# Patient Record
Sex: FEMALE | Race: WHITE
[De-identification: names, ages, dates, MRNs, and addresses within clinical notes are randomized per-mention and may not be internally consistent; named-entity substitution may affect disease eponyms.]

---

## 2021-10-18 ENCOUNTER — RESULT REVIEW (OUTPATIENT)
Age: 61
End: 2021-10-18

## 2022-06-09 ENCOUNTER — RESULT REVIEW (OUTPATIENT)
Age: 62
End: 2022-06-09

## 2022-06-09 ENCOUNTER — APPOINTMENT (OUTPATIENT)
Dept: GASTROENTEROLOGY | Facility: CLINIC | Age: 62
End: 2022-06-09
Payer: MEDICAID

## 2022-06-09 VITALS
BODY MASS INDEX: 22.18 KG/M2 | HEART RATE: 69 BPM | OXYGEN SATURATION: 98 % | SYSTOLIC BLOOD PRESSURE: 94 MMHG | HEIGHT: 66 IN | DIASTOLIC BLOOD PRESSURE: 58 MMHG | WEIGHT: 138 LBS | TEMPERATURE: 98 F

## 2022-06-09 PROBLEM — Z00.00 ENCOUNTER FOR PREVENTIVE HEALTH EXAMINATION: Status: ACTIVE | Noted: 2022-06-09

## 2022-06-09 PROCEDURE — 99204 OFFICE O/P NEW MOD 45 MIN: CPT

## 2022-06-09 NOTE — REASON FOR VISIT
[Consultation] : a consultation visit [FreeTextEntry1] : Patient seen at the request of Dr. Vern Carrion for the evaluation of foreign body ingestion

## 2022-06-09 NOTE — HISTORY OF PRESENT ILLNESS
[FreeTextEntry1] : 61-year-old PMH asthma, ortho surgeries celiac, s/p single balloon enteroscopy after VCE performed for chronic abdominal pain/n/v that hung up in [facility], ("body was healing from infection related to gluten", tattood small bowel) \par all sx resolved after stopping gluten. \par \par Dr. Mendelsohn at [facility]. \par \par  Patient had dental work done yesterday AM, inadvertently swallowed a dental prosthesis. presented to ED where she felt like it was stuck in her throat.  She drank some water afterwards with no specific complication.  No trouble breathing.  No other symptoms. ER physician spoke to dentist who reported object was 6 mm in size 1 mm diameter and not sharp\par \par AXR\par IMPRESSION: An opaque foreign body projected over the mid-upper abdomen is possibly in the duodenum. \par \par Neck and Cxr\par No radiopaque foreign body is visualized within the neck or chest.\par \par \par 3 hours after swallowing object had xray\par mild upset stomach, she had a long day without eating yesterday, 2 light meals yesterday without difficulty\par she had normal bm this morning. \par no f/c. \par denies abd pain.

## 2022-06-09 NOTE — PHYSICAL EXAM
[General Appearance - Alert] : alert [General Appearance - In No Acute Distress] : in no acute distress [Sclera] : the sclera and conjunctiva were normal [Outer Ear] : the ears and nose were normal in appearance [Hearing Threshold Finger Rub Not Cache] : hearing was normal [Neck Appearance] : the appearance of the neck was normal [] : no respiratory distress [Apical Impulse] : the apical impulse was normal [Abdomen Soft] : soft [Abdomen Tenderness] : non-tender [Abnormal Walk] : normal gait [Skin Color & Pigmentation] : normal skin color and pigmentation [Oriented To Time, Place, And Person] : oriented to person, place, and time [FreeTextEntry1] : deferred

## 2022-06-09 NOTE — ASSESSMENT
[FreeTextEntry1] : xrays ~ 3 hours after ingestion showed it possibly in duodenum. \par based on reported size, object likely to pass out without intervention\par will obtain xray today. \par if still present, will need serial xray\par \par patient instructed to call if n/v/abd pain, f/c or other concerns.

## 2022-06-13 DIAGNOSIS — T18.9XXA FOREIGN BODY OF ALIMENTARY TRACT, PART UNSPECIFIED, INITIAL ENCOUNTER: ICD-10-CM

## 2022-06-23 ENCOUNTER — RESULT REVIEW (OUTPATIENT)
Age: 62
End: 2022-06-23

## 2023-06-04 ENCOUNTER — TRANSCRIPTION ENCOUNTER (OUTPATIENT)
Age: 63
End: 2023-06-04

## 2024-04-19 ENCOUNTER — NON-APPOINTMENT (OUTPATIENT)
Age: 64
End: 2024-04-19

## 2025-05-07 ENCOUNTER — TRANSCRIPTION ENCOUNTER (OUTPATIENT)
Age: 65
End: 2025-05-07